# Patient Record
Sex: FEMALE | Race: WHITE | NOT HISPANIC OR LATINO | Employment: FULL TIME | URBAN - METROPOLITAN AREA
[De-identification: names, ages, dates, MRNs, and addresses within clinical notes are randomized per-mention and may not be internally consistent; named-entity substitution may affect disease eponyms.]

---

## 2021-03-01 ENCOUNTER — TELEPHONE (OUTPATIENT)
Dept: GASTROENTEROLOGY | Facility: CLINIC | Age: 61
End: 2021-03-01

## 2021-03-01 NOTE — TELEPHONE ENCOUNTER
After speaking with scheduling did ask to speak with pt again  She was transferred back to discuss her symptoms  When in the ER they felt her SOB was related to her chronic fatigue syndrome, fibromyalgia, hiatal hernia and increase reflux  (She notes has increased her coffee intake and believes it is reflux)  It is better today  She noted they did a CXR and CT scan and all was fine  She did leave a message for her PCP requesting records be faxed here  Did encourage pt to return to the ER if SOB worsens, chest pain, dizziness or develops pain  Pt agreeable    Also questioned following up with her PCP and she noted would come in for her apt here tomorrow first

## 2021-03-01 NOTE — TELEPHONE ENCOUNTER
Called pt back and offered her an apt tomorrow with Dr Gaming Ax  She was hoping to wait for Dr Hayley Russ but does feel with her present situation she should come in sooner  Transferred to scheduling

## 2021-03-02 ENCOUNTER — OFFICE VISIT (OUTPATIENT)
Dept: GASTROENTEROLOGY | Facility: CLINIC | Age: 61
End: 2021-03-02
Payer: COMMERCIAL

## 2021-03-02 VITALS
BODY MASS INDEX: 29.57 KG/M2 | WEIGHT: 184 LBS | SYSTOLIC BLOOD PRESSURE: 116 MMHG | HEIGHT: 66 IN | DIASTOLIC BLOOD PRESSURE: 70 MMHG | HEART RATE: 78 BPM

## 2021-03-02 DIAGNOSIS — K21.9 GASTROESOPHAGEAL REFLUX DISEASE WITHOUT ESOPHAGITIS: ICD-10-CM

## 2021-03-02 DIAGNOSIS — Z12.11 COLON CANCER SCREENING: ICD-10-CM

## 2021-03-02 DIAGNOSIS — R19.4 CHANGE IN BOWEL HABITS: Primary | ICD-10-CM

## 2021-03-02 PROCEDURE — 99204 OFFICE O/P NEW MOD 45 MIN: CPT | Performed by: INTERNAL MEDICINE

## 2021-03-02 RX ORDER — FAMOTIDINE 20 MG/1
20 TABLET, FILM COATED ORAL DAILY
COMMUNITY
End: 2021-03-02 | Stop reason: SDUPTHER

## 2021-03-02 RX ORDER — FAMOTIDINE 20 MG/1
20 TABLET, FILM COATED ORAL DAILY
Start: 2021-03-02

## 2021-03-02 RX ORDER — POLYETHYLENE GLYCOL 3350 17 G/17G
17 POWDER, FOR SOLUTION ORAL DAILY
Qty: 289 G | Refills: 2 | Status: SHIPPED | OUTPATIENT
Start: 2021-03-02

## 2021-03-02 NOTE — PROGRESS NOTES
[address] [facility] - Outpatient Consultation  Nicole Mcmahon 63 y o  female MRN: [id number]  Encounter: [id number]    ASSESSMENT AND PLAN:      1  Change in bowel habits  60F referred to us by Dr Maranda Hernandez for change in bowel habits  Sounds like h/o IBS with gluten/dairy intolerance, recently aggravated by the 2nd COVID vaccine  Has a complicated history of perf diverticulitis s/p sig resection, last colon in 2016      - Start polyethylene glycol (GLYCOLAX) 17 GM/SCOOP powder; Take 17 g by mouth daily  Dispense: 289 g; Refill: 2  - Schedule Colonoscopy @ [address] [place] [address]  2  Gastroesophageal reflux disease without esophagitis  Recently started by ER  Thinks its helping  Will cont for now  - famotidine (PEPCID) 20 mg tablet; Take 1 tablet (20 mg total) by mouth daily  Dispense:      3  Colon cancer screening  Average risk  Last colon in 2016 neg w 10 yr recall  Followup Appointment: 3 months  ______________________________________________________________________    Chief Complaint   Patient presents with    Abdominal Pain       HPI:   Nicole Mcmahon is a 63y o  year old female who presents today as a new pt at the request of PCP for change in bowel habits  Pt states she has always had intermittent issues w her bowels  However, normally self resolving  Never lasting more than a few days  She had a work up in her younger days and was told she has IBS  When she maintains a gluten and dairy free diet, she feels great  Recently, got her 2nd covid vaccine and afterwards felt some flu like symptoms  After that, she felt extremely constipated and bloated with abd pain  She went to the ER for this pain  She felt so short of breath because of the bloating  She was discharged on pepcid  Admits she has been eating poorly recently  Weight gain  No n/v  No f/c  No GIB      Historical Information   Past Medical History:   Diagnosis Date    Diverticulitis of colon 2016    perforated s/p sig resection, temp colostomy, reversed @ [facility] DVT (deep venous thrombosis) ([facility] [address] ) 2016    Fibromyalgia     GERD (gastroesophageal reflux disease)     IBS (irritable bowel syndrome)     Postherpetic neuralgia      Past Surgical History:   Procedure Laterality Date    COLON SIGMOID RESECTION  2016    COLONOSCOPY  2016    10 yr recall    KNEE ARTHROSCOPY Right     UPPER GASTROINTESTINAL ENDOSCOPY  2012    Normal EGD, bx neg for Barretts, HP, Celiac     Social History     Substance and Sexual Activity   Alcohol Use Yes    Frequency: 2-4 times a month     Social History     Substance and Sexual Activity   Drug Use Never     Social History     Tobacco Use   Smoking Status Never Smoker   Smokeless Tobacco Never Used     Family History   Problem Relation Age of Onset    COPD Father     Colon cancer Neg Hx     Colon polyps Neg Hx        Meds/Allergies     Current Outpatient Medications:     famotidine (PEPCID) 20 mg tablet    polyethylene glycol (GLYCOLAX) 17 GM/SCOOP powder    Allergies   Allergen Reactions    Codeine Other (See Comments)     Mental problems    Erythromycin GI Intolerance    Levaquin [Levofloxacin] Other (See Comments)     Tendon issues         PHYSICAL EXAM:    Blood pressure 116/70, pulse 78, height 5' 6" (1 676 m), weight 83 5 kg (184 lb)  Body mass index is 29 7 kg/m²  General Appearance: NAD, cooperative, alert  Eyes: Anicteric, PERRLA, EOMI  ENT:  Normocephalic, atraumatic, normal mucosa  Neck:  Supple, symmetrical, trachea midline,   Resp:  Clear to auscultation bilaterally; no rales, rhonchi or wheezing; respirations unlabored   CV:  S1 S2, Regular rate and rhythm; no murmur, rub, or gallop  GI:  Soft, non-tender, non-distended; normal bowel sounds; no masses, no organomegaly   Rectal: Deferred  Musculoskeletal: No cyanosis, clubbing or edema  Normal ROM    Skin:  No jaundice, rashes, or lesions   Heme/Lymph: No palpable cervical lymphadenopathy  Psych: Normal affect, good eye contact  Neuro: No gross deficits, AAOx3    Lab Results:   No results found for: WBC, HGB, HCT, MCV, PLT  No results found for: NA, K, CL, CO2, ANIONGAP, BUN, CREATININE, GLUCOSE, GLUF, CALCIUM, CORRECTEDCA, AST, ALT, ALKPHOS, PROT, BILITOT, EGFR  No results found for: IRON, TIBC, FERRITIN  No results found for: LIPASE    Radiology Results:   No results found  REVIEW OF SYSTEMS:    CONSTITUTIONAL: Denies any fever, chills, rigors, and weight loss  HEENT: No earache or tinnitus  Denies hearing loss or visual disturbances  CARDIOVASCULAR: No chest pain or palpitations  RESPIRATORY: Denies any cough, hemoptysis, shortness of breath or dyspnea on exertion  GASTROINTESTINAL: As noted in the History of Present Illness  GENITOURINARY: No problems with urination  Denies any hematuria or dysuria  NEUROLOGIC: No dizziness or vertigo, denies headaches  MUSCULOSKELETAL: Denies any muscle or joint pain  SKIN: Denies skin rashes or itching  ENDOCRINE: Denies excessive thirst  Denies intolerance to heat or cold  PSYCHOSOCIAL: Denies depression or anxiety  Denies any recent memory loss

## 2021-03-03 ENCOUNTER — TELEPHONE (OUTPATIENT)
Dept: GASTROENTEROLOGY | Facility: CLINIC | Age: 61
End: 2021-03-03

## 2021-03-03 NOTE — TELEPHONE ENCOUNTER
1st call-spoke to pt to sched colon at endo ordered from ov w/dr sepulveda 3/2/21-pt will call to sched   Has to much going on right now

## 2021-03-24 NOTE — TELEPHONE ENCOUNTER
Dr Tin Sanford was contacted to sched colon ordered from last ov with no success-pt sched ov 4/30/21 with dr Gavino Beebe   Send final letter of let dr Gavino Beebe see pt

## 2021-10-26 ENCOUNTER — TELEPHONE (OUTPATIENT)
Dept: GASTROENTEROLOGY | Facility: CLINIC | Age: 61
End: 2021-10-26